# Patient Record
Sex: MALE | Race: WHITE | NOT HISPANIC OR LATINO | Employment: FULL TIME | URBAN - METROPOLITAN AREA
[De-identification: names, ages, dates, MRNs, and addresses within clinical notes are randomized per-mention and may not be internally consistent; named-entity substitution may affect disease eponyms.]

---

## 2017-03-22 ENCOUNTER — DOCUMENTATION ONLY (OUTPATIENT)
Dept: FAMILY MEDICINE | Facility: CLINIC | Age: 22
End: 2017-03-22

## 2017-03-22 ENCOUNTER — OFFICE VISIT (OUTPATIENT)
Dept: FAMILY MEDICINE | Facility: CLINIC | Age: 22
End: 2017-03-22
Payer: COMMERCIAL

## 2017-03-22 VITALS
BODY MASS INDEX: 34.04 KG/M2 | WEIGHT: 224.63 LBS | SYSTOLIC BLOOD PRESSURE: 132 MMHG | HEART RATE: 104 BPM | OXYGEN SATURATION: 98 % | HEIGHT: 68 IN | DIASTOLIC BLOOD PRESSURE: 88 MMHG | TEMPERATURE: 99 F | RESPIRATION RATE: 16 BRPM

## 2017-03-22 DIAGNOSIS — K20.90 ESOPHAGITIS: ICD-10-CM

## 2017-03-22 DIAGNOSIS — I86.1 LEFT VARICOCELE: Primary | ICD-10-CM

## 2017-03-22 DIAGNOSIS — K29.70 GASTRITIS, PRESENCE OF BLEEDING UNSPECIFIED, UNSPECIFIED CHRONICITY, UNSPECIFIED GASTRITIS TYPE: ICD-10-CM

## 2017-03-22 DIAGNOSIS — K27.9 PEPTIC ULCER DISEASE: ICD-10-CM

## 2017-03-22 DIAGNOSIS — K44.9 HIATAL HERNIA: ICD-10-CM

## 2017-03-22 DIAGNOSIS — G89.29 CHRONIC MIDLINE LOW BACK PAIN WITHOUT SCIATICA: ICD-10-CM

## 2017-03-22 DIAGNOSIS — M54.50 CHRONIC MIDLINE LOW BACK PAIN WITHOUT SCIATICA: ICD-10-CM

## 2017-03-22 PROCEDURE — 3075F SYST BP GE 130 - 139MM HG: CPT | Mod: S$GLB,,, | Performed by: INTERNAL MEDICINE

## 2017-03-22 PROCEDURE — 99214 OFFICE O/P EST MOD 30 MIN: CPT | Mod: S$GLB,,, | Performed by: INTERNAL MEDICINE

## 2017-03-22 PROCEDURE — 3079F DIAST BP 80-89 MM HG: CPT | Mod: S$GLB,,, | Performed by: INTERNAL MEDICINE

## 2017-03-22 PROCEDURE — 1160F RVW MEDS BY RX/DR IN RCRD: CPT | Mod: S$GLB,,, | Performed by: INTERNAL MEDICINE

## 2017-03-22 RX ORDER — PANTOPRAZOLE SODIUM 40 MG/1
40 TABLET, DELAYED RELEASE ORAL DAILY
Qty: 30 TABLET | Refills: 3 | Status: SHIPPED | OUTPATIENT
Start: 2017-03-22 | End: 2017-05-18 | Stop reason: SDUPTHER

## 2017-03-22 NOTE — PROGRESS NOTES
Subjective:       Patient ID: Ritesh Fitzpatrick is a 22 y.o. male.    Chief Complaint: Testicle Pain (possible knot,refill protonix) and Back Pain (lower)    HPI   CHIEF :COMPLAINT:  Scrotal pain  HPI: Gradual onset  ONSET:     3 weeks    ago.   DURATION: .  3 days  QUALITY/COURSE: .Resolved  LOCATION: Behind left testicle  INTENSITY/SEVERITY: # 5/10 now 0 (on 1 to 10 scale)  CONTEXT/WHEN:          MODIFIERS/TREATMENTS: Taking Medications: ? .  No   Compliant with Taking Prescribed Medications: ?   . Prior X-Rays: no  Prior Labs: no    Movements, medications or activities that worsen the problem:       Movements, medications, or activities that ease the problem:      The below section is positive for the patient ONLY if BOLDED:    SYMPTOMS/RELATED: Difficulty activating.  Dysuria.  Frequency.  Nausea.  Vomiting.  Fever.          CHIEF COMPLAINT: Back Pain.  HPI: Gets it after he does a lot of lifting    ONSET/TIMING: Onset   9 months    ago. . Inciting event:  Lifting: no.  Over-exertion: no.     DURATION: . Intermittent    QUALITY/COURSE:   unchanged    LOCATION:       Midline s1         Radiation:   none    INTENSITY/SEVERITY: Severity is #    2  (10 point scale)..      MODIFIERS/TREATMENTS: .. Taking medications:    yes. . . Litigation_pending: no ..  MRI: no .    SYMPTOMS/RELATED: . --Possible medication side effects include:  .     The symptoms/statements  below are positive if BOLDED, otherwise negative.           CONTEXT/WHEN: . --Activity. . Coughing..  Bending.  Sitting. Hx_of_CA:.. 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  Work_related.. Similar_problems _in_past. Trauma .       REVIEW OF SYMPTOMS:       .Leg _Pain_to_below_knee.  Hip_pain..  Weight_loss.   .    Patient has been on Protonix for over year for peptic ulcer disease.  He never took antibiotics.  Review of Systems    Objective:      Vitals:    03/22/17 1028 03/22/17 1030   BP: (!) 138/90 132/88   Pulse: 104    Resp: 16    Temp: 98.5 °F (36.9 °C)    TempSrc:  "Oral    SpO2: 98%    Weight: 101.9 kg (224 lb 10.4 oz)    Height: 5' 8" (1.727 m)    PainSc:   2    PainLoc: Back      Physical Exam   Constitutional: He appears well-developed and well-nourished.   Eyes: Pupils are equal, round, and reactive to light.   Cardiovascular: Normal rate, regular rhythm and normal heart sounds.    Pulmonary/Chest: Effort normal and breath sounds normal.   Abdominal: Soft. There is no tenderness.   Genitourinary:   Genitourinary Comments: Varicocele present behind left testicle.  No tenderness   Neurological: He is alert.   Psychiatric: He has a normal mood and affect. His behavior is normal. Thought content normal.   Nursing note and vitals reviewed.        Assessment:       1. Left varicocele    2. Peptic ulcer disease    3. Chronic midline low back pain without sciatica    4. Esophagitis    5. Hiatal hernia    6. Gastritis, presence of bleeding unspecified, unspecified chronicity, unspecified gastritis type          Plan:     Left varicocele    Peptic ulcer disease  -     pantoprazole (PROTONIX) 40 MG tablet; Take 1 tablet (40 mg total) by mouth once daily.  Dispense: 30 tablet; Refill: 3  -     H.Pylori Antibody IgG; Future; Expected date: 3/22/17    Chronic midline low back pain without sciatica    Esophagitis    Hiatal hernia    Gastritis, presence of bleeding unspecified, unspecified chronicity, unspecified gastritis type    Return in about 3 months (around 6/22/2017).      "

## 2017-03-22 NOTE — PROGRESS NOTES
Health Maintenance Due   Topic Date Due    Lipid Panel  1996    HPV Vaccines (1 of 3 - Male 3 Dose Series) 03/11/2007    TETANUS VACCINE  03/11/2014    Influenza Vaccine  08/01/2016

## 2017-03-22 NOTE — PATIENT INSTRUCTIONS
Use a smart temp cold pack if you back pain.      Go to an ER if the pain in the scrotum is sudden and severe.  If you have torsion only have 6 hours to save your testicle .    If your H. pylori test is positive we will call you in antibiotics.

## 2017-03-30 ENCOUNTER — TELEPHONE (OUTPATIENT)
Dept: UROLOGY | Facility: CLINIC | Age: 22
End: 2017-03-30

## 2017-03-30 NOTE — TELEPHONE ENCOUNTER
Call transferred from call room. Pt mother states patient was seen by Dr Fitzpatrick for scrotal pain states she was not satisfied and wants son to follow up with urology. Requested first avail with physician only. Date time and location confirmed.

## 2017-04-19 ENCOUNTER — HOSPITAL ENCOUNTER (OUTPATIENT)
Dept: RADIOLOGY | Facility: HOSPITAL | Age: 22
Discharge: HOME | End: 2017-04-19
Attending: UROLOGY
Payer: COMMERCIAL

## 2017-04-19 ENCOUNTER — OFFICE VISIT (OUTPATIENT)
Dept: UROLOGY | Facility: CLINIC | Age: 22
End: 2017-04-19
Payer: COMMERCIAL

## 2017-04-19 VITALS
WEIGHT: 222 LBS | HEIGHT: 68 IN | DIASTOLIC BLOOD PRESSURE: 85 MMHG | TEMPERATURE: 98 F | BODY MASS INDEX: 33.65 KG/M2 | HEART RATE: 78 BPM | SYSTOLIC BLOOD PRESSURE: 136 MMHG

## 2017-04-19 DIAGNOSIS — N50.819 PAINFUL TESTIS: Primary | ICD-10-CM

## 2017-04-19 DIAGNOSIS — N50.819 PAINFUL TESTIS: ICD-10-CM

## 2017-04-19 PROCEDURE — 76870 US EXAM SCROTUM: CPT | Mod: 26,,, | Performed by: RADIOLOGY

## 2017-04-19 PROCEDURE — 76870 US EXAM SCROTUM: CPT | Mod: TC

## 2017-04-19 PROCEDURE — 3079F DIAST BP 80-89 MM HG: CPT | Mod: S$GLB,,, | Performed by: UROLOGY

## 2017-04-19 PROCEDURE — 99203 OFFICE O/P NEW LOW 30 MIN: CPT | Mod: S$GLB,,, | Performed by: UROLOGY

## 2017-04-19 PROCEDURE — 99999 PR PBB SHADOW E&M-EST. PATIENT-LVL III: CPT | Mod: PBBFAC,,, | Performed by: UROLOGY

## 2017-04-19 PROCEDURE — 3075F SYST BP GE 130 - 139MM HG: CPT | Mod: S$GLB,,, | Performed by: UROLOGY

## 2017-04-19 PROCEDURE — 1160F RVW MEDS BY RX/DR IN RCRD: CPT | Mod: S$GLB,,, | Performed by: UROLOGY

## 2017-04-19 NOTE — PROGRESS NOTES
Subjective:       Patient ID: Ritesh Fitzpatrick is a 22 y.o. male.    Chief Complaint:   OFFICE NOTE    CHIEF COMPLAINT:  Left testicular pain.    Mr. Gurrola is a 22-year-old male who referred that for the last 10 years, he   has been experiencing intermittent pain in the left testicle.  The pain is   sudden, lasts a few hours and the pain resolves.  He refers that in the last two   months, has not having any pain, but he is concerned about that recurrent   symptom and would like to be evaluated further.  The patient has a consult with   another physician and the patient has been told that he has a left varicocele.    The patient denies dysuria.  There is no hematuria.  He has no nocturia.  There   is no history of any swelling in the testicle.  There is no history of trauma on   the testicle and the family history is negative for any  malignancy.  The   patient was not able to give us a urinalysis today.    Past medical history, past surgical history, the current medications, and the   allergies are well documented in the medical record, including medications and   all these were reviewed by me during this visit.      EOR/PN  dd: 04/19/2017 13:38:50 (CDT)  td: 04/19/2017 18:37:12 (CDT)  Doc ID   #[id number]  Job ID #[id number]    CC:       HPI  Review of Systems   Constitutional: Negative for activity change and appetite change.   HENT: Negative.    Eyes: Negative for discharge.   Respiratory: Negative for cough and shortness of breath.    Cardiovascular: Negative for chest pain and palpitations.   Gastrointestinal: Negative for abdominal distention, abdominal pain, constipation and vomiting.   Genitourinary: Positive for testicular pain. Negative for discharge, dysuria, flank pain, frequency, hematuria and urgency.   Musculoskeletal: Negative for arthralgias.   Skin: Negative for rash.   Neurological: Negative for dizziness.   Psychiatric/Behavioral: The patient is not nervous/anxious.        Objective:      Physical  Exam   Constitutional: He appears well-developed and well-nourished.   HENT:   Head: Normocephalic.   Eyes: Pupils are equal, round, and reactive to light.   Neck: Normal range of motion.   Cardiovascular: Normal rate.    Pulmonary/Chest: Effort normal.   Abdominal: Soft. He exhibits no distension and no mass. There is no tenderness. Hernia confirmed negative in the right inguinal area and confirmed negative in the left inguinal area.   Genitourinary: Rectum normal, prostate normal and penis normal. Rectal exam shows no external hemorrhoid, no mass and no tenderness. Prostate is not enlarged and not tender. Right testis shows no mass, no swelling and no tenderness. Left testis shows tenderness. Left testis shows no mass and no swelling. Circumcised. No discharge found.         Musculoskeletal: Normal range of motion.   Neurological: He is alert.   Skin: Skin is warm.     Psychiatric: He has a normal mood and affect.       Assessment:       1. Painful testis        Plan:       Painful testis  -     US Scrotum And Testicles; Future; Expected date: 4/19/17    I suggest he have chronic left epididymitis that have no treatment. US to r/o a serious problem (neoplasm?) No Palpable masses.

## 2017-04-20 ENCOUNTER — TELEPHONE (OUTPATIENT)
Dept: UROLOGY | Facility: CLINIC | Age: 22
End: 2017-04-20

## 2017-04-20 NOTE — TELEPHONE ENCOUNTER
----- Message from Rosa Guevara sent at 4/20/2017 12:55 PM CDT -----  Patient requesting his results from yesterday.     Call placed to pod. No answer     Please call patient back at [phone] best contact number.     Thank you

## 2017-05-18 DIAGNOSIS — K27.9 PEPTIC ULCER DISEASE: ICD-10-CM

## 2017-05-18 RX ORDER — PANTOPRAZOLE SODIUM 40 MG/1
40 TABLET, DELAYED RELEASE ORAL DAILY
Qty: 30 TABLET | Refills: 3 | Status: SHIPPED | OUTPATIENT
Start: 2017-05-18 | End: 2017-11-20 | Stop reason: SDUPTHER

## 2017-05-26 ENCOUNTER — TELEPHONE (OUTPATIENT)
Dept: UROLOGY | Facility: CLINIC | Age: 22
End: 2017-05-26

## 2017-05-26 NOTE — TELEPHONE ENCOUNTER
----- Message from Nuzhat Ace sent at 5/26/2017 12:47 PM CDT -----  Contact: self   Patient just started a new job and needs documentation regarding his diagnosis. Patient has a meeting with his boss today at 3:30. Please call patient at [phone]. Thanks!

## 2017-11-20 DIAGNOSIS — K27.9 PEPTIC ULCER DISEASE: ICD-10-CM

## 2017-11-20 RX ORDER — PANTOPRAZOLE SODIUM 40 MG/1
40 TABLET, DELAYED RELEASE ORAL DAILY
Qty: 120 TABLET | Refills: 0 | Status: SHIPPED | OUTPATIENT
Start: 2017-11-20 | End: 2018-03-09 | Stop reason: SDUPTHER

## 2018-03-09 ENCOUNTER — OFFICE VISIT (OUTPATIENT)
Dept: FAMILY MEDICINE | Facility: CLINIC | Age: 23
End: 2018-03-09
Payer: COMMERCIAL

## 2018-03-09 ENCOUNTER — DOCUMENTATION ONLY (OUTPATIENT)
Dept: FAMILY MEDICINE | Facility: CLINIC | Age: 23
End: 2018-03-09

## 2018-03-09 VITALS
HEART RATE: 98 BPM | SYSTOLIC BLOOD PRESSURE: 134 MMHG | DIASTOLIC BLOOD PRESSURE: 89 MMHG | WEIGHT: 257.06 LBS | BODY MASS INDEX: 38.96 KG/M2 | HEIGHT: 68 IN | OXYGEN SATURATION: 99 % | TEMPERATURE: 99 F

## 2018-03-09 DIAGNOSIS — R07.89 ATYPICAL CHEST PAIN: Primary | ICD-10-CM

## 2018-03-09 DIAGNOSIS — K27.9 PEPTIC ULCER DISEASE: ICD-10-CM

## 2018-03-09 PROCEDURE — 3079F DIAST BP 80-89 MM HG: CPT | Mod: S$GLB,,, | Performed by: INTERNAL MEDICINE

## 2018-03-09 PROCEDURE — 93005 ELECTROCARDIOGRAM TRACING: CPT | Mod: S$GLB,,, | Performed by: INTERNAL MEDICINE

## 2018-03-09 PROCEDURE — 99214 OFFICE O/P EST MOD 30 MIN: CPT | Mod: S$GLB,,, | Performed by: INTERNAL MEDICINE

## 2018-03-09 PROCEDURE — 93010 ELECTROCARDIOGRAM REPORT: CPT | Mod: ,,, | Performed by: INTERNAL MEDICINE

## 2018-03-09 PROCEDURE — 3075F SYST BP GE 130 - 139MM HG: CPT | Mod: S$GLB,,, | Performed by: INTERNAL MEDICINE

## 2018-03-09 RX ORDER — PANTOPRAZOLE SODIUM 40 MG/1
40 TABLET, DELAYED RELEASE ORAL DAILY
Qty: 90 TABLET | Refills: 1 | Status: SHIPPED | OUTPATIENT
Start: 2018-03-09 | End: 2018-11-12 | Stop reason: SDUPTHER

## 2018-03-09 NOTE — PROGRESS NOTES
"Subjective:       Patient ID: Ritesh Fitzpatrick is a 22 y.o. male.    Chief Complaint: Chest Pain (off and on for 2 months)    HPI       CHIEF COMPLAINT: CHEST PAIN    HPI: at rest.  While the pain only lasted seconds for 5 weeks the patient had a 20 minute episode 3 weeks ago after wrestling people.  That was the only episode where he had shortness of breath     ONSET/TIMIN mo  ago    DURATION:  Max 20 mins  QUALITY/COURSE:  unchanged.    SEVERITY: #    2  /10 ( on 1 to 10 scale)    PREVIOUS CARDIAC TESTING: :none  LOCATION:  Left axillary area   Radiation -  none    All choices for next 4 sections below are positive to the patient ONLY if BOLDED, otherwise negative:    AGGRAVATING FACTORS: Swallowing, , Deep_Breathing, Coughing, Neck/Arm/Chest_Movement , hunching over    RELIEVING FACTORS: Nitroglycerin, Resting, Change_of_Position    ASSOCIATED SYMPTOMS:  Hemoptysis,Tenderness,  Leg_Pain    RISK FACTORS: Diabetes, HTN, Hyperlipidemia, smoking,                     Review of Systems   Constitutional: Negative for chills, diaphoresis, fever and unexpected weight change.   Respiratory: Positive for chest tightness and shortness of breath (once). Negative for cough and wheezing.    Cardiovascular: Positive for chest pain. Negative for palpitations and leg swelling.   Gastrointestinal: Negative for abdominal pain, nausea and vomiting.   Neurological: Negative for dizziness, syncope and numbness.   Psychiatric/Behavioral: The patient is not nervous/anxious.        Objective:      Vitals:    18 1556   BP: 134/89   Pulse: 98   Temp: 98.7 °F (37.1 °C)   TempSrc: Oral   SpO2: 99%   Weight: 116.6 kg (257 lb 0.9 oz)   Height: 5' 8" (1.727 m)   PainSc:   3   PainLoc: Chest     Physical Exam   Constitutional: He appears well-developed and well-nourished.   Cardiovascular: Normal rate, regular rhythm and normal heart sounds.    Pulmonary/Chest: Effort normal and breath sounds normal.   Abdominal: Soft. There is no " tenderness.   Musculoskeletal: He exhibits tenderness (tenderness over the left anterior axillary fold when hecontracts his biceps against resistance).   Neurological: He is alert.   Psychiatric: He has a normal mood and affect. His behavior is normal. Thought content normal.   Nursing note and vitals reviewed.  ekg nl       Assessment:       1. Atypical chest pain    2. Peptic ulcer disease          Plan:   Most likely has musculoskeletal chest pain but could also be related to GERD.  We also need to rule out a pulmonary embolism        Atypical chest pain  -     EKG 12-lead; Future  -     D dimer, quantitative; Future; Expected date: 03/09/2018  -     X-Ray Chest PA And Lateral; Future; Expected date: 03/09/2018  -     Ambulatory Referral to Cardiology  -     Lipid panel; Future; Expected date: 03/09/2018    Peptic ulcer disease  -     pantoprazole (PROTONIX) 40 MG tablet; Take 1 tablet (40 mg total) by mouth once daily.  Dispense: 90 tablet; Refill: 1      Follow-up in about 3 months (around 6/9/2018).

## 2018-03-09 NOTE — PATIENT INSTRUCTIONS
Take your Protonix daily. Before a meal.     Rest left arm for a month.  You can use it for daily activities but no wrestling or weightlifting.  A loose tourniquet might help

## 2018-03-09 NOTE — PROGRESS NOTES
Health Maintenance Due   Topic Date Due    Lipid Panel  1996    HPV Vaccines (1 of 3 - Male 3 Dose Series) 03/11/2007    TETANUS VACCINE  03/11/2014    Influenza Vaccine  08/01/2017

## 2018-03-16 ENCOUNTER — HOSPITAL ENCOUNTER (OUTPATIENT)
Dept: RADIOLOGY | Facility: CLINIC | Age: 23
Discharge: HOME | End: 2018-03-16
Attending: INTERNAL MEDICINE
Payer: COMMERCIAL

## 2018-03-16 ENCOUNTER — LAB VISIT (OUTPATIENT)
Dept: LAB | Facility: HOSPITAL | Age: 23
End: 2018-03-16
Attending: INTERNAL MEDICINE
Payer: COMMERCIAL

## 2018-03-16 DIAGNOSIS — R07.89 ATYPICAL CHEST PAIN: ICD-10-CM

## 2018-03-16 LAB
CHOLEST SERPL-MCNC: 156 MG/DL
CHOLEST/HDLC SERPL: 3.6 {RATIO}
HDLC SERPL-MCNC: 43 MG/DL
HDLC SERPL: 27.6 %
LDLC SERPL CALC-MCNC: 87.2 MG/DL
NONHDLC SERPL-MCNC: 113 MG/DL
TRIGL SERPL-MCNC: 129 MG/DL

## 2018-03-16 PROCEDURE — 71046 X-RAY EXAM CHEST 2 VIEWS: CPT | Mod: 26,,, | Performed by: RADIOLOGY

## 2018-03-16 PROCEDURE — 71046 X-RAY EXAM CHEST 2 VIEWS: CPT | Mod: TC,FY,PO

## 2018-03-16 PROCEDURE — 36415 COLL VENOUS BLD VENIPUNCTURE: CPT | Mod: PO

## 2018-03-16 PROCEDURE — 80061 LIPID PANEL: CPT

## 2018-03-19 ENCOUNTER — TELEPHONE (OUTPATIENT)
Dept: FAMILY MEDICINE | Facility: CLINIC | Age: 23
End: 2018-03-19

## 2018-03-19 DIAGNOSIS — R06.00 DYSPNEA, UNSPECIFIED TYPE: Primary | ICD-10-CM

## 2018-03-19 NOTE — TELEPHONE ENCOUNTER
----- Message from Tye Roy sent at 3/19/2018  8:08 AM CDT -----  Regarding: Lab Client Services  Contact: [phone]  Hi my name is Tye I work in the lab Client Services. We had a problem with some lab work on this patient. If someone from your office could call us at [phone] or ext [phone] that would be great. Anyone in my department can help. Thank You

## 2018-03-20 ENCOUNTER — TELEPHONE (OUTPATIENT)
Dept: FAMILY MEDICINE | Facility: CLINIC | Age: 23
End: 2018-03-20

## 2018-03-20 NOTE — TELEPHONE ENCOUNTER
----- Message from Luisa Mcnulty sent at 3/20/2018 10:51 AM CDT -----  Please call patient in regards to a missed call concerning results, [phone] (home)

## 2018-03-20 NOTE — TELEPHONE ENCOUNTER
Spoke with patient's mother.  No one contacted to redraw d-dimer.  Instructed to go back to lab to have test done again.

## 2018-11-12 DIAGNOSIS — K27.9 PEPTIC ULCER DISEASE: ICD-10-CM

## 2018-11-12 RX ORDER — PANTOPRAZOLE SODIUM 40 MG/1
40 TABLET, DELAYED RELEASE ORAL DAILY
Qty: 90 TABLET | Refills: 0 | Status: SHIPPED | OUTPATIENT
Start: 2018-11-12 | End: 2019-05-07 | Stop reason: SDUPTHER

## 2019-05-07 ENCOUNTER — OFFICE VISIT (OUTPATIENT)
Dept: GASTROENTEROLOGY | Facility: CLINIC | Age: 24
End: 2019-05-07
Payer: COMMERCIAL

## 2019-05-07 VITALS
SYSTOLIC BLOOD PRESSURE: 137 MMHG | BODY MASS INDEX: 38.85 KG/M2 | WEIGHT: 255.5 LBS | DIASTOLIC BLOOD PRESSURE: 82 MMHG | HEART RATE: 108 BPM

## 2019-05-07 DIAGNOSIS — K27.9 PEPTIC ULCER DISEASE: ICD-10-CM

## 2019-05-07 DIAGNOSIS — K21.9 GASTROESOPHAGEAL REFLUX DISEASE, ESOPHAGITIS PRESENCE NOT SPECIFIED: Primary | ICD-10-CM

## 2019-05-07 PROCEDURE — 99999 PR PBB SHADOW E&M-EST. PATIENT-LVL III: ICD-10-PCS | Mod: PBBFAC,,, | Performed by: INTERNAL MEDICINE

## 2019-05-07 PROCEDURE — 3008F BODY MASS INDEX DOCD: CPT | Mod: CPTII,S$GLB,, | Performed by: INTERNAL MEDICINE

## 2019-05-07 PROCEDURE — 99204 OFFICE O/P NEW MOD 45 MIN: CPT | Mod: S$GLB,,, | Performed by: INTERNAL MEDICINE

## 2019-05-07 PROCEDURE — 3008F PR BODY MASS INDEX (BMI) DOCUMENTED: ICD-10-PCS | Mod: CPTII,S$GLB,, | Performed by: INTERNAL MEDICINE

## 2019-05-07 PROCEDURE — 99204 PR OFFICE/OUTPT VISIT, NEW, LEVL IV, 45-59 MIN: ICD-10-PCS | Mod: S$GLB,,, | Performed by: INTERNAL MEDICINE

## 2019-05-07 PROCEDURE — 99999 PR PBB SHADOW E&M-EST. PATIENT-LVL III: CPT | Mod: PBBFAC,,, | Performed by: INTERNAL MEDICINE

## 2019-05-07 RX ORDER — PANTOPRAZOLE SODIUM 40 MG/1
40 TABLET, DELAYED RELEASE ORAL DAILY
Qty: 90 TABLET | Refills: 3 | Status: SHIPPED | OUTPATIENT
Start: 2019-05-07 | End: 2022-10-03 | Stop reason: SDUPTHER

## 2019-05-07 NOTE — PROGRESS NOTES
"Subjective:       Patient ID: Ritesh Fitzpatrick is a 24 y.o. male.    This patient is new to me.      Chief Complaint: Abdominal Pain    PAST HISTORY:    Abdominal Pain   This is a chronic problem. The current episode started more than 1 year ago. The onset quality is undetermined. The problem occurs daily. Duration: variable. The problem has been unchanged. The pain is located in the epigastric region. The pain is at a severity of 3/10. The pain is mild. Quality: Mostly associated with nausea upon waking in the AM. The abdominal pain does not radiate. The pain is relieved by nothing. He has tried nothing for the symptoms. The treatment provided no relief. Prior workup: Had EGD at [facility] at age 6 and mother was told he had "beginnings of ulcer" There is no history of abdominal surgery.   No history of colonoscopy.  No nocturnal symptoms.  He admits to intake of multiple caffeinated beverages daily as well as admits to late night eating.      INTERVAL HISTORY:  Since last visit, patient had EGD which showed esophagitis.  He has been compliant with PPI since and has had resolution of symptoms.  He does note recurrence of symptoms when missing doses but overall is doing well.  He denies bleeding, dysphagia, change in bowel habits, or weight loss.  No other complaints.      Review of Systems   Eyes: Negative for pain and visual disturbance.   Respiratory: Negative for cough, shortness of breath and wheezing.    Cardiovascular: Negative for chest pain and palpitations.   Musculoskeletal: Negative for neck pain and neck stiffness.   Skin: Negative for color change.   Neurological: Negative for numbness.   Psychiatric/Behavioral: Negative for agitation and confusion.       Objective:      Physical Exam   Constitutional: He is oriented to person, place, and time. He appears well-developed and well-nourished.   HENT:   Head: Normocephalic and atraumatic.   Eyes: Pupils are equal, round, and reactive to light. No " scleral icterus.   Neck: Normal range of motion. Neck supple. No thyromegaly present.   Cardiovascular: Normal rate and regular rhythm.   No murmur heard.  Pulmonary/Chest: Effort normal and breath sounds normal. He has no wheezes.   Abdominal: Soft. Bowel sounds are normal. He exhibits no distension. There is no tenderness.   Lymphadenopathy:     He has no cervical adenopathy.   Neurological: He is alert and oriented to person, place, and time.   Skin: Skin is warm and dry. No rash noted. No erythema.   Psychiatric: He has a normal mood and affect. His behavior is normal.         Lab Results   Component Value Date    WBC 7.60 11/17/2015    HGB 16.1 11/17/2015    HCT 48.0 11/17/2015    MCV 90 11/17/2015     11/17/2015       CMP  Sodium   Date Value Ref Range Status   11/17/2015 141 136 - 145 mmol/L Final     Potassium   Date Value Ref Range Status   11/17/2015 3.6 3.5 - 5.1 mmol/L Final     Chloride   Date Value Ref Range Status   11/17/2015 105 95 - 110 mmol/L Final     CO2   Date Value Ref Range Status   11/17/2015 27 23 - 29 mmol/L Final     Glucose   Date Value Ref Range Status   11/17/2015 92 70 - 110 mg/dL Final     BUN, Bld   Date Value Ref Range Status   11/17/2015 11 6 - 20 mg/dL Final     Creatinine   Date Value Ref Range Status   11/17/2015 0.9 0.5 - 1.4 mg/dL Final     Calcium   Date Value Ref Range Status   11/17/2015 9.9 8.7 - 10.5 mg/dL Final     Total Protein   Date Value Ref Range Status   11/17/2015 7.3 6.0 - 8.4 g/dL Final     Albumin   Date Value Ref Range Status   11/17/2015 4.3 3.5 - 5.2 g/dL Final     Total Bilirubin   Date Value Ref Range Status   11/17/2015 1.3 (H) 0.1 - 1.0 mg/dL Final     Comment:     For infants and newborns, interpretation of results should be based  on gestational age, weight and in agreement with clinical  observations.  Premature Infant recommended reference ranges:  Up to 24 hours.............<8.0 mg/dL  Up to 48 hours............<12.0 mg/dL  3-5  days..................<15.0 mg/dL  6-29 days.................<15.0 mg/dL       Alkaline Phosphatase   Date Value Ref Range Status   11/17/2015 68 55 - 135 U/L Final     AST   Date Value Ref Range Status   11/17/2015 18 10 - 40 U/L Final     ALT   Date Value Ref Range Status   11/17/2015 36 10 - 44 U/L Final     Anion Gap   Date Value Ref Range Status   11/17/2015 9 8 - 16 mmol/L Final     eGFR if    Date Value Ref Range Status   11/17/2015 >60 >60 mL/min/1.73 m^2 Final     eGFR if non    Date Value Ref Range Status   11/17/2015 >60 >60 mL/min/1.73 m^2 Final     Comment:     Calculation used to obtain the estimated glomerular filtration  rate (eGFR) is the CKD-EPI equation. Since race is unknown   in our information system, the eGFR values for   -American and Non--American patients are given   for each creatinine result.             Assessment:       1. Gastroesophageal reflux disease, esophagitis presence not specified    2. Peptic ulcer disease        Plan:       1.  Continue PPI.  Will refill.  2.  Avoid NSAIDs  3.  Check labs including celiac screen  4.  Follow up with PCP  5.  Follow up in my office PRN.

## 2020-02-11 ENCOUNTER — LAB VISIT (OUTPATIENT)
Dept: LAB | Facility: HOSPITAL | Age: 25
End: 2020-02-11
Attending: INTERNAL MEDICINE
Payer: COMMERCIAL

## 2020-02-11 ENCOUNTER — OFFICE VISIT (OUTPATIENT)
Dept: FAMILY MEDICINE | Facility: CLINIC | Age: 25
End: 2020-02-11
Payer: COMMERCIAL

## 2020-02-11 VITALS
HEIGHT: 68 IN | WEIGHT: 246.5 LBS | HEART RATE: 89 BPM | OXYGEN SATURATION: 97 % | RESPIRATION RATE: 16 BRPM | DIASTOLIC BLOOD PRESSURE: 66 MMHG | BODY MASS INDEX: 37.36 KG/M2 | TEMPERATURE: 98 F | SYSTOLIC BLOOD PRESSURE: 120 MMHG

## 2020-02-11 DIAGNOSIS — Z00.00 ANNUAL PHYSICAL EXAM: Primary | ICD-10-CM

## 2020-02-11 DIAGNOSIS — Z20.2 STD EXPOSURE: ICD-10-CM

## 2020-02-11 PROCEDURE — 99395 PREV VISIT EST AGE 18-39: CPT | Mod: S$GLB,,, | Performed by: INTERNAL MEDICINE

## 2020-02-11 PROCEDURE — 36415 COLL VENOUS BLD VENIPUNCTURE: CPT

## 2020-02-11 PROCEDURE — 87491 CHLMYD TRACH DNA AMP PROBE: CPT

## 2020-02-11 PROCEDURE — 86592 SYPHILIS TEST NON-TREP QUAL: CPT

## 2020-02-11 PROCEDURE — 86703 HIV-1/HIV-2 1 RESULT ANTBDY: CPT

## 2020-02-11 PROCEDURE — 99395 PR PREVENTIVE VISIT,EST,18-39: ICD-10-PCS | Mod: S$GLB,,, | Performed by: INTERNAL MEDICINE

## 2020-02-11 NOTE — PROGRESS NOTES
"Subjective:      1:39 PM     Patient ID: Ritesh Fitzpatrick is a 24 y.o. male.    Chief Complaint: Annual Exam and bump on groin area (pt wants to check for std)    HPI     Biggest missed medical opportunity according to the patient: weekend drinking    Smoking: no  Alcohol: no   Street drugs:no    Diet issues: dieting now, lost 11 lbs, working out.     Exercise: yes    Mood:  Good    Sadness: no    Anhedonia: no    Sleep: good  Snoring: no    Brush teeth:  yes  Floss teeth: no    Wears seatbelts: yes    Girlfriend cheated on him.  He last had sex with her 4 months ago.    Review of Systems   Constitutional: Negative for fatigue, fever and unexpected weight change.   HENT: Negative for dental problem, hearing loss, nosebleeds, rhinorrhea, tinnitus, trouble swallowing and voice change.    Eyes: Negative for itching and visual disturbance.   Respiratory: Negative for cough, shortness of breath and wheezing.    Cardiovascular: Negative for chest pain and palpitations.   Gastrointestinal: Negative for abdominal pain, blood in stool, constipation, diarrhea, nausea and vomiting.   Endocrine: Negative for cold intolerance, heat intolerance, polydipsia and polyphagia.   Genitourinary: Negative for difficulty urinating and dysuria.   Musculoskeletal: Negative for arthralgias.   Allergic/Immunologic: Negative for environmental allergies and immunocompromised state.   Neurological: Negative for dizziness, seizures, weakness, numbness and headaches.   Hematological: Does not bruise/bleed easily.   Psychiatric/Behavioral: Negative for agitation, dysphoric mood, sleep disturbance and suicidal ideas. The patient is not nervous/anxious.          Objective:      Vitals:    02/11/20 1322   BP: 120/66   Pulse: 89   Resp: 16   Temp: 98.3 °F (36.8 °C)   TempSrc: Oral   SpO2: 97%   Weight: 111.8 kg (246 lb 7.6 oz)   Height: 5' 8" (1.727 m)   PainSc: 0-No pain     Physical Exam   Constitutional: He appears well-developed and " well-nourished.   Cardiovascular: Normal rate, regular rhythm and normal heart sounds.   Pulmonary/Chest: Effort normal and breath sounds normal.   Abdominal: Soft. There is no tenderness.   Musculoskeletal:   Several 1 mm white nodules 1 on the penis in 1 behind the left scrotum.   Neurological: He is alert.   Psychiatric: He has a normal mood and affect. His behavior is normal. Thought content normal.   Nursing note and vitals reviewed.        Assessment:       1. Annual physical exam    2. STD exposure          Plan:       Annual physical exam    STD exposure  -     C. trachomatis/N. gonorrhoeae by AMP DNA  -     RPR; Future; Expected date: 02/11/2020  -     Rapid HIV; Future; Expected date: 02/11/2020      Follow up in about 1 year (around 2/11/2021).

## 2020-02-12 LAB
C TRACH DNA SPEC QL NAA+PROBE: NOT DETECTED
HIV1+2 IGG SERPL QL IA.RAPID: NORMAL
N GONORRHOEA DNA SPEC QL NAA+PROBE: NOT DETECTED
RPR SER QL: NORMAL

## 2021-04-05 ENCOUNTER — PATIENT MESSAGE (OUTPATIENT)
Dept: ADMINISTRATIVE | Facility: HOSPITAL | Age: 26
End: 2021-04-05

## 2021-04-12 ENCOUNTER — HOSPITAL ENCOUNTER (EMERGENCY)
Facility: HOSPITAL | Age: 26
Discharge: HOME | End: 2021-04-13
Attending: FAMILY MEDICINE
Payer: COMMERCIAL

## 2021-04-12 VITALS
SYSTOLIC BLOOD PRESSURE: 144 MMHG | BODY MASS INDEX: 37.13 KG/M2 | DIASTOLIC BLOOD PRESSURE: 114 MMHG | WEIGHT: 245 LBS | HEART RATE: 86 BPM | HEIGHT: 68 IN | RESPIRATION RATE: 20 BRPM

## 2021-04-12 DIAGNOSIS — R52 PAIN: ICD-10-CM

## 2021-04-12 DIAGNOSIS — R07.89 CHEST WALL PAIN: Primary | ICD-10-CM

## 2021-04-12 PROCEDURE — 93005 ELECTROCARDIOGRAM TRACING: CPT

## 2021-04-12 PROCEDURE — 99284 EMERGENCY DEPT VISIT MOD MDM: CPT | Mod: 25

## 2021-07-01 ENCOUNTER — PATIENT MESSAGE (OUTPATIENT)
Dept: ADMINISTRATIVE | Facility: OTHER | Age: 26
End: 2021-07-01

## 2021-07-06 ENCOUNTER — PATIENT MESSAGE (OUTPATIENT)
Dept: ADMINISTRATIVE | Facility: HOSPITAL | Age: 26
End: 2021-07-06

## 2021-10-07 ENCOUNTER — PATIENT MESSAGE (OUTPATIENT)
Dept: ADMINISTRATIVE | Facility: HOSPITAL | Age: 26
End: 2021-10-07

## 2022-10-03 DIAGNOSIS — K27.9 PEPTIC ULCER DISEASE: ICD-10-CM

## 2022-10-03 RX ORDER — PANTOPRAZOLE SODIUM 40 MG/1
40 TABLET, DELAYED RELEASE ORAL DAILY
Qty: 90 TABLET | Refills: 3 | Status: SHIPPED | OUTPATIENT
Start: 2022-10-03 | End: 2023-01-02 | Stop reason: SDUPTHER

## 2022-10-20 ENCOUNTER — OFFICE VISIT (OUTPATIENT)
Dept: GASTROENTEROLOGY | Facility: CLINIC | Age: 27
End: 2022-10-20
Payer: COMMERCIAL

## 2022-10-20 VITALS
SYSTOLIC BLOOD PRESSURE: 126 MMHG | BODY MASS INDEX: 36.94 KG/M2 | WEIGHT: 242.94 LBS | DIASTOLIC BLOOD PRESSURE: 77 MMHG | HEART RATE: 80 BPM

## 2022-10-20 DIAGNOSIS — R19.4 CHANGE IN BOWEL HABITS: ICD-10-CM

## 2022-10-20 DIAGNOSIS — R10.13 EPIGASTRIC DISCOMFORT: ICD-10-CM

## 2022-10-20 DIAGNOSIS — R11.0 CHRONIC NAUSEA: Primary | ICD-10-CM

## 2022-10-20 PROCEDURE — 99999 PR PBB SHADOW E&M-EST. PATIENT-LVL III: CPT | Mod: PBBFAC,,, | Performed by: INTERNAL MEDICINE

## 2022-10-20 PROCEDURE — 1159F MED LIST DOCD IN RCRD: CPT | Mod: CPTII,S$GLB,, | Performed by: INTERNAL MEDICINE

## 2022-10-20 PROCEDURE — 3074F SYST BP LT 130 MM HG: CPT | Mod: CPTII,S$GLB,, | Performed by: INTERNAL MEDICINE

## 2022-10-20 PROCEDURE — 99203 OFFICE O/P NEW LOW 30 MIN: CPT | Mod: S$GLB,,, | Performed by: INTERNAL MEDICINE

## 2022-10-20 PROCEDURE — 99999 PR PBB SHADOW E&M-EST. PATIENT-LVL III: ICD-10-PCS | Mod: PBBFAC,,, | Performed by: INTERNAL MEDICINE

## 2022-10-20 PROCEDURE — 3074F PR MOST RECENT SYSTOLIC BLOOD PRESSURE < 130 MM HG: ICD-10-PCS | Mod: CPTII,S$GLB,, | Performed by: INTERNAL MEDICINE

## 2022-10-20 PROCEDURE — 3078F PR MOST RECENT DIASTOLIC BLOOD PRESSURE < 80 MM HG: ICD-10-PCS | Mod: CPTII,S$GLB,, | Performed by: INTERNAL MEDICINE

## 2022-10-20 PROCEDURE — 99203 PR OFFICE/OUTPT VISIT, NEW, LEVL III, 30-44 MIN: ICD-10-PCS | Mod: S$GLB,,, | Performed by: INTERNAL MEDICINE

## 2022-10-20 PROCEDURE — 1159F PR MEDICATION LIST DOCUMENTED IN MEDICAL RECORD: ICD-10-PCS | Mod: CPTII,S$GLB,, | Performed by: INTERNAL MEDICINE

## 2022-10-20 PROCEDURE — 3078F DIAST BP <80 MM HG: CPT | Mod: CPTII,S$GLB,, | Performed by: INTERNAL MEDICINE

## 2022-10-20 NOTE — PROGRESS NOTES
"Subjective:       Patient ID: Ritesh Fitzpatrick is a 27 y.o. male.    This is an established patient.        Chief Complaint: Abdominal Pain    PAST HISTORY:    Abdominal Pain  This is a chronic problem. The current episode started more than 1 year ago. The onset quality is undetermined. The problem occurs daily. Duration: variable. The problem has been unchanged. The pain is located in the epigastric region. The pain is at a severity of 3/10. The pain is mild. Quality: Mostly associated with nausea upon waking in the AM. The abdominal pain does not radiate. Associated symptoms include nausea. Pertinent negatives include no arthralgias, constipation, diarrhea, fever, myalgias or vomiting. The pain is relieved by Nothing. He has tried nothing for the symptoms. The treatment provided no relief. Prior workup: [facility] age 6 and mother was told he had "beginnings of ulcer" There is no history of abdominal surgery. No history of colonoscopy.  No nocturnal symptoms.  He admits to intake of multiple caffeinated beverages daily as well as admits to late night eating.      Patient had EGD which showed esophagitis.  He has been compliant with PPI since and has had resolution of symptoms.  He does note recurrence of symptoms when missing doses but overall is doing well.  He denies bleeding, dysphagia, change in bowel habits, or weight loss.  No other complaints.    INTERVAL HISTORY:  Since last visit, patient has done well for quite some time.  He is compliant with PPI and only rarely has breakthrough GERD symptoms.  Of late, he has noticed some daily nausea, usually upon waking, without dysphagia/bleeding/emesis/weight loss and with no obvious alleviating/exacerbating factors.  He denies pain.  He has some chronically loose/soft stool which is nonbloody.  His bowel habit is 3-4 BMs daily with no bleeding or nocturnal symptoms.  No recent labs.        Review of Systems   Constitutional:  Negative for chills, " fatigue and fever.   HENT:  Negative for trouble swallowing.    Eyes:  Negative for pain and visual disturbance.   Respiratory:  Negative for cough, shortness of breath and wheezing.    Cardiovascular:  Negative for chest pain and palpitations.   Gastrointestinal:  Positive for nausea. Negative for abdominal pain, constipation, diarrhea and vomiting.        + change in bowel habits     Musculoskeletal:  Negative for arthralgias, myalgias and neck stiffness.   Skin:  Negative for color change and rash.   Neurological:  Negative for dizziness, weakness and numbness.   Psychiatric/Behavioral:  Negative for agitation and confusion. The patient is not nervous/anxious.    All other systems reviewed and are negative.    Objective:      Vitals:    10/20/22 1408   BP: 126/77   BP Location: Left arm   Patient Position: Sitting   Pulse: 80   Weight: 110.2 kg (242 lb 15.2 oz)       Physical Exam  Constitutional:       Appearance: He is well-developed.   HENT:      Head: Normocephalic and atraumatic.   Eyes:      General: No scleral icterus.     Pupils: Pupils are equal, round, and reactive to light.   Neck:      Thyroid: No thyromegaly.   Cardiovascular:      Rate and Rhythm: Normal rate and regular rhythm.      Heart sounds: No murmur heard.  Pulmonary:      Effort: Pulmonary effort is normal.      Breath sounds: Normal breath sounds. No wheezing.   Abdominal:      General: Bowel sounds are normal. There is no distension.      Palpations: Abdomen is soft.      Tenderness: There is no abdominal tenderness.   Musculoskeletal:      Cervical back: Normal range of motion and neck supple.   Lymphadenopathy:      Cervical: No cervical adenopathy.   Skin:     General: Skin is warm and dry.      Findings: No erythema or rash.   Neurological:      Mental Status: He is alert and oriented to person, place, and time.   Psychiatric:         Behavior: Behavior normal.         Lab Results   Component Value Date    WBC 7.60 11/17/2015    HGB  16.1 11/17/2015    HCT 48.0 11/17/2015    MCV 90 11/17/2015     11/17/2015       CMP  Sodium   Date Value Ref Range Status   11/17/2015 141 136 - 145 mmol/L Final     Potassium   Date Value Ref Range Status   11/17/2015 3.6 3.5 - 5.1 mmol/L Final     Chloride   Date Value Ref Range Status   11/17/2015 105 95 - 110 mmol/L Final     CO2   Date Value Ref Range Status   11/17/2015 27 23 - 29 mmol/L Final     Glucose   Date Value Ref Range Status   11/17/2015 92 70 - 110 mg/dL Final     BUN   Date Value Ref Range Status   11/17/2015 11 6 - 20 mg/dL Final     Creatinine   Date Value Ref Range Status   11/17/2015 0.9 0.5 - 1.4 mg/dL Final     Calcium   Date Value Ref Range Status   11/17/2015 9.9 8.7 - 10.5 mg/dL Final     Total Protein   Date Value Ref Range Status   11/17/2015 7.3 6.0 - 8.4 g/dL Final     Albumin   Date Value Ref Range Status   11/17/2015 4.3 3.5 - 5.2 g/dL Final     Total Bilirubin   Date Value Ref Range Status   11/17/2015 1.3 (H) 0.1 - 1.0 mg/dL Final     Comment:     For infants and newborns, interpretation of results should be based  on gestational age, weight and in agreement with clinical  observations.  Premature Infant recommended reference ranges:  Up to 24 hours.............<8.0 mg/dL  Up to 48 hours............<12.0 mg/dL  3-5 days..................<15.0 mg/dL  6-29 days.................<15.0 mg/dL       Alkaline Phosphatase   Date Value Ref Range Status   11/17/2015 68 55 - 135 U/L Final     AST   Date Value Ref Range Status   11/17/2015 18 10 - 40 U/L Final     ALT   Date Value Ref Range Status   11/17/2015 36 10 - 44 U/L Final     Anion Gap   Date Value Ref Range Status   11/17/2015 9 8 - 16 mmol/L Final     eGFR if    Date Value Ref Range Status   11/17/2015 >60 >60 mL/min/1.73 m^2 Final     eGFR if non    Date Value Ref Range Status   11/17/2015 >60 >60 mL/min/1.73 m^2 Final     Comment:     Calculation used to obtain the estimated glomerular  filtration  rate (eGFR) is the CKD-EPI equation. Since race is unknown   in our information system, the eGFR values for   -American and Non--American patients are given   for each creatinine result.             Assessment:       1. Chronic nausea    2. Change in bowel habits    3. Epigastric discomfort        Plan:       1.  Continue PPI.   2.  Avoid NSAIDs  3.  Check labs including celiac screen  4.  Check abdominal ultrasound  5.  Consider IBGard +/- phazyme +/- probiotic  6.  Increase fiber in diet  7.  If symptoms persist despite above, consider repeat EGD.  8.  Further recommendations to follow after above.

## 2022-11-21 ENCOUNTER — HOSPITAL ENCOUNTER (OUTPATIENT)
Dept: RADIOLOGY | Facility: HOSPITAL | Age: 27
Discharge: HOME | End: 2022-11-21
Attending: INTERNAL MEDICINE
Payer: COMMERCIAL

## 2022-11-21 DIAGNOSIS — R11.0 CHRONIC NAUSEA: ICD-10-CM

## 2022-11-21 DIAGNOSIS — R19.4 CHANGE IN BOWEL HABITS: ICD-10-CM

## 2022-11-21 DIAGNOSIS — R10.13 EPIGASTRIC DISCOMFORT: ICD-10-CM

## 2022-11-21 PROCEDURE — 76700 US ABDOMEN COMPLETE: ICD-10-PCS | Mod: 26,,, | Performed by: RADIOLOGY

## 2022-11-21 PROCEDURE — 76700 US EXAM ABDOM COMPLETE: CPT | Mod: TC

## 2022-11-21 PROCEDURE — 76700 US EXAM ABDOM COMPLETE: CPT | Mod: 26,,, | Performed by: RADIOLOGY

## 2022-11-23 NOTE — PROGRESS NOTES
Please advise patient that labs were unremarkable and ultrasound showed only fatty liver.  Diet/exercise/weight reduction recommended.

## 2023-07-05 DIAGNOSIS — K27.9 PEPTIC ULCER DISEASE: ICD-10-CM

## 2023-07-06 RX ORDER — PANTOPRAZOLE SODIUM 40 MG/1
40 TABLET, DELAYED RELEASE ORAL DAILY
Qty: 90 TABLET | Refills: 3 | Status: SHIPPED | OUTPATIENT
Start: 2023-07-06 | End: 2023-08-03 | Stop reason: SDUPTHER

## 2023-08-03 DIAGNOSIS — K27.9 PEPTIC ULCER DISEASE: ICD-10-CM

## 2023-08-03 RX ORDER — PANTOPRAZOLE SODIUM 40 MG/1
40 TABLET, DELAYED RELEASE ORAL DAILY
Qty: 90 TABLET | Refills: 3 | Status: SHIPPED | OUTPATIENT
Start: 2023-08-03 | End: 2024-08-02

## 2024-05-28 DIAGNOSIS — K27.9 PEPTIC ULCER DISEASE: ICD-10-CM

## 2024-05-29 RX ORDER — PANTOPRAZOLE SODIUM 40 MG/1
40 TABLET, DELAYED RELEASE ORAL DAILY
Qty: 90 TABLET | Refills: 3 | Status: SHIPPED | OUTPATIENT
Start: 2024-05-29 | End: 2025-05-29

## 2024-09-03 DIAGNOSIS — K27.9 PEPTIC ULCER DISEASE: ICD-10-CM

## 2024-09-03 RX ORDER — PANTOPRAZOLE SODIUM 40 MG/1
40 TABLET, DELAYED RELEASE ORAL DAILY
Qty: 90 TABLET | Refills: 3 | Status: SHIPPED | OUTPATIENT
Start: 2024-09-03 | End: 2025-09-03

## 2024-12-04 DIAGNOSIS — K27.9 PEPTIC ULCER DISEASE: ICD-10-CM

## 2024-12-05 RX ORDER — PANTOPRAZOLE SODIUM 40 MG/1
40 TABLET, DELAYED RELEASE ORAL DAILY
Qty: 90 TABLET | Refills: 3 | Status: SHIPPED | OUTPATIENT
Start: 2024-12-05 | End: 2025-12-05

## 2025-03-08 DIAGNOSIS — K27.9 PEPTIC ULCER DISEASE: ICD-10-CM

## 2025-03-10 RX ORDER — PANTOPRAZOLE SODIUM 40 MG/1
40 TABLET, DELAYED RELEASE ORAL DAILY
Qty: 90 TABLET | Refills: 3 | Status: SHIPPED | OUTPATIENT
Start: 2025-03-10 | End: 2026-03-10

## 2025-06-18 DIAGNOSIS — K27.9 PEPTIC ULCER DISEASE: ICD-10-CM

## 2025-06-18 RX ORDER — PANTOPRAZOLE SODIUM 40 MG/1
40 TABLET, DELAYED RELEASE ORAL DAILY
Qty: 90 TABLET | Refills: 3 | OUTPATIENT
Start: 2025-06-18 | End: 2026-06-18

## 2025-06-27 NOTE — PROGRESS NOTES
Subjective:       Patient ID: Ritesh Fitzpatrick is a 30 y.o. male Body mass index is 37.78 kg/m².    Chief Complaint: Follow-up and Medication Refill    This patient is new to me.  Referring Provider: No ref. provider found for med refill.  Established patient of Dr. Mcgraw.     Pt last seen over 3 years ago and here for refill of his protonix. He reports doing well except occasional nausea when he wakes up which he notices happens when he wakes up with a runny nose.       Review of Systems   Constitutional:  Negative for activity change, appetite change, fatigue, fever and unexpected weight change.   HENT:  Negative for sore throat and trouble swallowing.    Respiratory:  Negative for cough and shortness of breath.    Cardiovascular:  Negative for chest pain.   Gastrointestinal:  Positive for nausea (occasional). Negative for abdominal distention, abdominal pain, anal bleeding, blood in stool, constipation, diarrhea, rectal pain and vomiting.       No LMP for male patient.  Past Medical History:   Diagnosis Date    Ulcer      No past surgical history on file.  Family History   Problem Relation Name Age of Onset    Hypertension Mother      No Known Problems Father       Social History[1]  Wt Readings from Last 10 Encounters:   07/01/25 112.7 kg (248 lb 7.3 oz)   10/20/22 110.2 kg (242 lb 15.2 oz)   04/12/21 111.1 kg (245 lb)   02/11/20 111.8 kg (246 lb 7.6 oz)   05/07/19 115.9 kg (255 lb 8.2 oz)   03/09/18 116.6 kg (257 lb 0.9 oz)   04/19/17 100.7 kg (222 lb)   03/22/17 101.9 kg (224 lb 10.4 oz)   12/30/15 112.5 kg (248 lb 0.3 oz) (>99%, Z= 2.37)*   10/29/15 107.6 kg (237 lb 3.4 oz) (99%, Z= 2.19)*     * Growth percentiles are based on CDC (Boys, 2-20 Years) data.     Lab Results   Component Value Date    WBC 5.16 11/21/2022    HGB 16.0 11/21/2022    HCT 45.8 11/21/2022    MCV 85 11/21/2022     11/21/2022     CMP  Sodium   Date Value Ref Range Status   11/21/2022 142 136 - 145 mmol/L Final     Potassium    Date Value Ref Range Status   11/21/2022 3.8 3.5 - 5.1 mmol/L Final     Chloride   Date Value Ref Range Status   11/21/2022 107 95 - 110 mmol/L Final     CO2   Date Value Ref Range Status   11/21/2022 23 23 - 29 mmol/L Final     Glucose   Date Value Ref Range Status   11/21/2022 102 70 - 110 mg/dL Final     BUN   Date Value Ref Range Status   11/21/2022 8 6 - 20 mg/dL Final     Creatinine   Date Value Ref Range Status   11/21/2022 0.9 0.5 - 1.4 mg/dL Final     Calcium   Date Value Ref Range Status   11/21/2022 9.6 8.7 - 10.5 mg/dL Final     Total Protein   Date Value Ref Range Status   11/21/2022 7.5 6.0 - 8.4 g/dL Final     Albumin   Date Value Ref Range Status   11/21/2022 4.3 3.5 - 5.2 g/dL Final     Total Bilirubin   Date Value Ref Range Status   11/21/2022 1.1 (H) 0.1 - 1.0 mg/dL Final     Comment:     For infants and newborns, interpretation of results should be based  on gestational age, weight and in agreement with clinical  observations.    Premature Infant recommended reference ranges:  Up to 24 hours.............<8.0 mg/dL  Up to 48 hours............<12.0 mg/dL  3-5 days..................<15.0 mg/dL  6-29 days.................<15.0 mg/dL       Alkaline Phosphatase   Date Value Ref Range Status   11/21/2022 61 55 - 135 U/L Final     AST   Date Value Ref Range Status   11/21/2022 24 10 - 40 U/L Final     ALT   Date Value Ref Range Status   11/21/2022 44 10 - 44 U/L Final     Anion Gap   Date Value Ref Range Status   11/21/2022 12 8 - 16 mmol/L Final     eGFR if    Date Value Ref Range Status   11/17/2015 >60 >60 mL/min/1.73 m^2 Final     eGFR if non    Date Value Ref Range Status   11/17/2015 >60 >60 mL/min/1.73 m^2 Final     Comment:     Calculation used to obtain the estimated glomerular filtration  rate (eGFR) is the CKD-EPI equation. Since race is unknown   in our information system, the eGFR values for   -American and Non--American patients are given  "  for each creatinine result.       Lab Results   Component Value Date    AMYLASE 50 11/17/2015     Lab Results   Component Value Date    LIPASE 20 11/21/2022     No results found for: "LIPASERES"  Lab Results   Component Value Date    TSH 0.841 11/21/2022       Reviewed prior medical records including radiology report of n/a & endoscopy history (see surgical history).    Objective:      Physical Exam  Vitals and nursing note reviewed.   Constitutional:       General: He is not in acute distress.     Appearance: He is obese. He is not ill-appearing.   HENT:      Head: Normocephalic and atraumatic.      Mouth/Throat:      Mouth: Mucous membranes are moist.      Pharynx: Oropharynx is clear.   Eyes:      Conjunctiva/sclera: Conjunctivae normal.   Cardiovascular:      Rate and Rhythm: Normal rate and regular rhythm.      Pulses: Normal pulses.   Pulmonary:      Effort: Pulmonary effort is normal.   Abdominal:      General: Abdomen is flat. Bowel sounds are normal. There is no distension.      Palpations: Abdomen is soft.      Tenderness: There is no abdominal tenderness.   Skin:     General: Skin is warm and dry.      Capillary Refill: Capillary refill takes 2 to 3 seconds.   Neurological:      Mental Status: He is alert and oriented to person, place, and time.         Assessment:       1. Peptic ulcer disease    2. Post-nasal drip    3. Nausea        Plan:       Peptic ulcer disease  -discussed about the different types of medications used to treat reflux and how to use them, antacids can be used PRN for breakthrough heartburn symptoms by reducing stomach acid that is already produced, H2 blockers work by limiting the amount acid production, & PPI's work to block acid production and are taken daily, patient verbalized understanding.  -Educated patient on lifestyle modifications to help control/reduce reflux/abdominal pain including: avoid large meals, avoid eating within 2-3 hours of bedtime (avoid late night eating & " lying down soon after eating), elevate head of bed if nocturnal symptoms are present, smoking cessation (if current smoker), & weight loss (if overweight).   -Educated to avoid known foods which trigger reflux symptoms & to minimize/avoid high-fat foods, chocolate, caffeine, citrus, alcohol, & tomato products.  -Advised to avoid/limit use of NSAID's, since they can cause GI upset, bleeding, and/or ulcers. If needed, take with food.     Continue protonix as prescribed.  -     pantoprazole (PROTONIX) 40 MG tablet; Take 1 tablet (40 mg total) by mouth once daily.  Dispense: 90 tablet; Refill: 3    Post-nasal drip & Nausea  Start OTC anti-histamine such as zyrtec or claritin daily to help ease post nasal drop. Can also do flonase daily.  Start pepcid nightly  -     famotidine (PEPCID) 40 MG tablet; Take 1 tablet (40 mg total) by mouth nightly as needed for Heartburn.  Dispense: 30 tablet; Refill: 11    Follow up in about 1 year (around 7/1/2026), or if symptoms worsen or fail to improve.      If no improvement in symptoms or symptoms worsen, call/follow-up at clinic or go to ER.       WILLOW Evans, ALONSOP-C    Encounter includes face to face time and non-face to face time preparing to see the patient (eg, review of tests), obtaining and/or reviewing separately obtained history, documenting clinical information in the electronic or other health record, independently interpreting results (not separately reported) and communicating results to the patient/family/caregiver, or care coordination (not separately reported).     A dictation software program was used for this note. Please expect some simple typographical errors in this note.         [1]   Social History  Tobacco Use    Smoking status: Never   Substance Use Topics    Alcohol use: No     Alcohol/week: 0.0 standard drinks of alcohol      yes...

## 2025-07-01 ENCOUNTER — OFFICE VISIT (OUTPATIENT)
Dept: GASTROENTEROLOGY | Facility: CLINIC | Age: 30
End: 2025-07-01

## 2025-07-01 VITALS — WEIGHT: 248.44 LBS | BODY MASS INDEX: 37.65 KG/M2 | HEIGHT: 68 IN

## 2025-07-01 DIAGNOSIS — R09.82 POST-NASAL DRIP: ICD-10-CM

## 2025-07-01 DIAGNOSIS — R11.0 NAUSEA: ICD-10-CM

## 2025-07-01 DIAGNOSIS — K27.9 PEPTIC ULCER DISEASE: Primary | ICD-10-CM

## 2025-07-01 PROCEDURE — 99213 OFFICE O/P EST LOW 20 MIN: CPT | Mod: PBBFAC,PN

## 2025-07-01 PROCEDURE — 99213 OFFICE O/P EST LOW 20 MIN: CPT | Mod: S$PBB,,,

## 2025-07-01 PROCEDURE — 99999 PR PBB SHADOW E&M-EST. PATIENT-LVL III: CPT | Mod: PBBFAC,,,

## 2025-07-01 RX ORDER — LOSARTAN POTASSIUM 25 MG/1
25 TABLET ORAL
COMMUNITY

## 2025-07-01 RX ORDER — HYDROCHLOROTHIAZIDE 12.5 MG/1
12.5 TABLET ORAL
COMMUNITY

## 2025-07-01 RX ORDER — PANTOPRAZOLE SODIUM 40 MG/1
40 TABLET, DELAYED RELEASE ORAL DAILY
Qty: 90 TABLET | Refills: 3 | Status: SHIPPED | OUTPATIENT
Start: 2025-07-01 | End: 2026-07-01

## 2025-07-01 RX ORDER — FAMOTIDINE 40 MG/1
40 TABLET, FILM COATED ORAL NIGHTLY PRN
Qty: 30 TABLET | Refills: 11 | Status: SHIPPED | OUTPATIENT
Start: 2025-07-01 | End: 2026-07-01

## 2025-07-01 RX ORDER — ONDANSETRON 4 MG/1
TABLET, ORALLY DISINTEGRATING ORAL
COMMUNITY